# Patient Record
Sex: MALE | Race: WHITE | NOT HISPANIC OR LATINO | Employment: FULL TIME | URBAN - METROPOLITAN AREA
[De-identification: names, ages, dates, MRNs, and addresses within clinical notes are randomized per-mention and may not be internally consistent; named-entity substitution may affect disease eponyms.]

---

## 2019-07-03 ENCOUNTER — HOSPITAL ENCOUNTER (EMERGENCY)
Facility: MEDICAL CENTER | Age: 54
End: 2019-07-04
Attending: EMERGENCY MEDICINE
Payer: COMMERCIAL

## 2019-07-03 DIAGNOSIS — S82.842A CLOSED BIMALLEOLAR FRACTURE OF LEFT ANKLE, INITIAL ENCOUNTER: ICD-10-CM

## 2019-07-03 PROCEDURE — 99284 EMERGENCY DEPT VISIT MOD MDM: CPT

## 2019-07-04 ENCOUNTER — HOSPITAL ENCOUNTER (OUTPATIENT)
Dept: RADIOLOGY | Facility: MEDICAL CENTER | Age: 54
End: 2019-07-04
Attending: EMERGENCY MEDICINE

## 2019-07-04 VITALS
WEIGHT: 183 LBS | HEART RATE: 69 BPM | TEMPERATURE: 96.8 F | HEIGHT: 72 IN | DIASTOLIC BLOOD PRESSURE: 85 MMHG | SYSTOLIC BLOOD PRESSURE: 124 MMHG | OXYGEN SATURATION: 98 % | BODY MASS INDEX: 24.79 KG/M2 | RESPIRATION RATE: 16 BRPM

## 2019-07-04 PROCEDURE — 29515 APPLICATION SHORT LEG SPLINT: CPT

## 2019-07-04 PROCEDURE — 302875 HCHG BANDAGE ACE 4 OR 6""

## 2019-07-04 PROCEDURE — 73610 X-RAY EXAM OF ANKLE: CPT | Mod: LT

## 2019-07-04 NOTE — ED PROVIDER NOTES
"ED Provider Note    CHIEF COMPLAINT  Chief Complaint   Patient presents with   • Ankle Injury     left       HPI  Gregorio Dill is a 54 y.o. male who presents for evaluation of left medial ankle pain after \"coming down hard, on the foot while rollerskating.  Patient felt clicks or pops during this and may have inverted the ankle as well.  He had immediate pain and swelling the medial and posterior portion of his ankle.  Patient states pain is controlled at this time and declines pain medications.  He has no knee or hip pain on the affected side and did not have any other injuries from the fall    REVIEW OF SYSTEMS  GEN: No fevers or chills  SKIN: No rashes  HEENT: No ear pain, ringing in ears, or decreased hearing. No sore throat, or runny nose  NECK: No neck pain or stiffness  CHEST: No pain   GI: No nausea or  MS: Left ankle pain  NEURO: No sensory or motor changes.           PAST MEDICAL HISTORY       SOCIAL HISTORY  Social History     Social History Main Topics   • Smoking status: Former Smoker   • Smokeless tobacco: Never Used   • Alcohol use No   • Drug use: No   • Sexual activity: Not on file       SURGICAL HISTORY  patient denies any surgical history    CURRENT MEDICATIONS  Home Medications    **Home medications have not yet been reviewed for this encounter**         ALLERGIES  No Known Allergies    PHYSICAL EXAM  VITAL SIGNS: /86   Pulse 71   Temp 36 °C (96.8 °F) (Temporal)   Resp 16   Ht 1.829 m (6')   Wt 83 kg (183 lb)   SpO2 97%   BMI 24.82 kg/m²    GEN: Alert in no apparent distress.  SKIN: Warm, Dry, No erythema, No rash.  HEENT: Normocephalic, Atraumatic, Bilateral external ears normal. Nose normal. Pupils are equal and reactive. Conjunctiva normal, non-icteric.   GI: No distention  CV: Regular rate and rythm, no murmurs.    MS: Left medial malleolus tenderness and swelling, left posterior malleolus tenderness and swelling.  There is no pain with palpation of the calcaneus and the " Achilles tendon appears intact.  Patient is able to plantarflex his foot without any significant distress and with 5 out of 5 strength  NEURO: Alert, Grossly non-focal.  Sensation intact to light touch to affected extremity  PSYCH: Affect normal, Judgment normal, Mood normal, Appears appropriate and not intoxicated.     RADIOLOGY  DX-ANKLE 3+ VIEWS LEFT   Final Result      Posterior and medial malleolar fractures          Reevaluation   Time: 1:10 AM  Assessment: Splint check performed, capillary refill less than 3 seconds to exposed toes, patient is able to wiggle toes without distress and has sensation intact to light touch      COURSE & MEDICAL DECISION MAKING  Pertinent Labs & Imaging studies reviewed. (See chart for details)  Patient arrives for evaluation of ankle pain and appears to have a bimalleolar fracture including the medial and posterior malleolus.  His Achilles tendon appears intact and the ankle mortise appears normal.  He has no pain proximal to the ankle itself and I do not feel any further imaging is necessary.  Patient is a [facility] patient in [place] and will follow up with an orthopedic surgeon when he gets home.  He will be given [facility] as a fall back.    FINAL IMPRESSION  1. Closed bimalleolar fracture of left ankle, initial encounter             Electronically signed by: Manas Stallings, 7/3/2019 11:06 PM

## 2019-07-04 NOTE — ED NOTES
Posterior short leg with stirrup splint applied to left leg, patient tolerated procedure well, sensation and pulses present in extremity, ok for discharge.

## 2019-07-04 NOTE — ED NOTES
Patient verbalized understanding of discharge instructions, provided with discharge paperwork, gait steady, ambulated independently to JUAN lindquist.

## 2019-07-04 NOTE — ED NOTES
Patient awake alert and oriented x 4, [place] [address], bed in low position, call light within reach, on room air, unlabored breathing noted, no cough noted, interacts with staff, interactions noted as appropriate.

## 2019-07-04 NOTE — ED NOTES
Patient awake alert and oriented x 4, [place] [address], bed in low position, call light within reach, on room air, unlabored breathing noted, no cough noted, interacts with staff, interactions noted as appropriate, significant other at bedside.

## 2019-07-04 NOTE — ED TRIAGE NOTES
Chief Complaint   Patient presents with   • Ankle Injury     left     C/O ankle pain secondary to a fall on roller skates today. Pt is positive for swelling and CMS intact. Denies other injury at this time. Pt educated on triage process and placed back in lobby, pt encourage to alert staff with changes in condition.      /86   Pulse 71   Temp 36 °C (96.8 °F) (Temporal)   Resp 16   Ht 1.829 m (6')   Wt 83 kg (183 lb)   SpO2 97%   BMI 24.82 kg/m²